# Patient Record
Sex: FEMALE | Race: BLACK OR AFRICAN AMERICAN | NOT HISPANIC OR LATINO | Employment: FULL TIME | URBAN - METROPOLITAN AREA
[De-identification: names, ages, dates, MRNs, and addresses within clinical notes are randomized per-mention and may not be internally consistent; named-entity substitution may affect disease eponyms.]

---

## 2023-04-12 ENCOUNTER — OFFICE VISIT (OUTPATIENT)
Dept: OBSTETRICS AND GYNECOLOGY | Age: 33
End: 2023-04-12
Payer: COMMERCIAL

## 2023-04-12 VITALS
DIASTOLIC BLOOD PRESSURE: 80 MMHG | SYSTOLIC BLOOD PRESSURE: 130 MMHG | HEIGHT: 62 IN | BODY MASS INDEX: 25.8 KG/M2 | WEIGHT: 140.2 LBS

## 2023-04-12 DIAGNOSIS — R10.2 PELVIC PAIN: ICD-10-CM

## 2023-04-12 DIAGNOSIS — Z12.4 SCREENING FOR CERVICAL CANCER: ICD-10-CM

## 2023-04-12 DIAGNOSIS — Z01.419 WELL WOMAN EXAM WITH ROUTINE GYNECOLOGICAL EXAM: Primary | ICD-10-CM

## 2023-04-12 DIAGNOSIS — Z76.89 ENCOUNTER TO ESTABLISH CARE: ICD-10-CM

## 2023-04-12 RX ORDER — SODIUM CHLORIDE 9 MG/ML
40 INJECTION, SOLUTION INTRAVENOUS AS NEEDED
OUTPATIENT
Start: 2023-04-12

## 2023-04-12 RX ORDER — DULOXETIN HYDROCHLORIDE 60 MG/1
1 CAPSULE, DELAYED RELEASE ORAL DAILY
COMMUNITY
Start: 2023-02-21

## 2023-04-12 RX ORDER — SODIUM CHLORIDE 0.9 % (FLUSH) 0.9 %
3-10 SYRINGE (ML) INJECTION AS NEEDED
OUTPATIENT
Start: 2023-04-12

## 2023-04-12 RX ORDER — SODIUM CHLORIDE 0.9 % (FLUSH) 0.9 %
3 SYRINGE (ML) INJECTION EVERY 12 HOURS SCHEDULED
OUTPATIENT
Start: 2023-04-12

## 2023-04-12 RX ORDER — DEXTROAMPHETAMINE SACCHARATE, AMPHETAMINE ASPARTATE MONOHYDRATE, DEXTROAMPHETAMINE SULFATE AND AMPHETAMINE SULFATE 5; 5; 5; 5 MG/1; MG/1; MG/1; MG/1
1 CAPSULE, EXTENDED RELEASE ORAL DAILY
COMMUNITY
Start: 2023-02-28

## 2023-04-12 RX ORDER — VITAMIN A ACETATE, BETA CAROTENE, ASCORBIC ACID, CHOLECALCIFEROL, .ALPHA.-TOCOPHEROL ACETATE, DL-, THIAMINE MONONITRATE, RIBOFLAVIN, NIACINAMIDE, PYRIDOXINE HYDROCHLORIDE, FOLIC ACID, CYANOCOBALAMIN, CALCIUM CARBONATE, FERROUS FUMARATE, ZINC OXIDE, CUPRIC OXIDE 3080; 12; 120; 400; 1; 1.84; 3; 20; 22; 920; 25; 200; 27; 10; 2 [IU]/1; UG/1; MG/1; [IU]/1; MG/1; MG/1; MG/1; MG/1; MG/1; [IU]/1; MG/1; MG/1; MG/1; MG/1; MG/1
1 TABLET, FILM COATED ORAL
COMMUNITY

## 2023-04-12 RX ORDER — LAMOTRIGINE 25 MG/1
2 TABLET ORAL DAILY
COMMUNITY
Start: 2023-01-18

## 2023-04-12 RX ORDER — CYCLOBENZAPRINE HCL 5 MG
TABLET ORAL
COMMUNITY
Start: 2023-02-01

## 2023-04-12 RX ORDER — ACETAMINOPHEN 160 MG
TABLET,DISINTEGRATING ORAL
COMMUNITY

## 2023-04-12 RX ORDER — FOLIC ACID 0.8 MG
TABLET ORAL
COMMUNITY

## 2023-04-12 NOTE — PROGRESS NOTES
"[facility]   Obstetrics and Gynecology   Routine Annual Visit    2023    Patient: Roberta Antunez          MR#:[id number]    History of Present Illness    Chief Complaint   Patient presents with   • Annual Exam   • Establish Care     NGYN - AE today, Last pap \"2 yrs ago\" normal, No problems today       32 y.o. female  who presents for annual exam.    -Reports severe dysmenorrhea since menarche.  Describes severe cramps that radiates to hips, legs, and rectum.  Also gets constipation before menses then diarrhea during menses.  -Reports fullness in right lower quadrant that is intermittently, severely painful, triggered by deep palpation and transvag ultrasound probe  -Has been to ED multiple times over last few yrs for these issues with no clear etiology identified  -minimal pain with intercourse  -Has tried multiple types of NSAIDs and OCPs with minimal or temporary improvement in pain.  She feels frustrated by this.  Some OCPs also worsened migraines.  -Reports depression that fluctuates with cycle and mood swings  -Reports facial hair, recently prescribed spironolactone by PCP but has not yet started      US pelvis transvaginal (2023 15:00)  CT Abdomen & Pelvis W IV Contrast Without Oral Contrast (2022 04:20)      Obstetric History:  OB History        0    Para   0    Term   0       0    AB   0    Living   0       SAB   0    IAB   0    Ectopic   0    Molar   0    Multiple   0    Live Births   0               Menstrual History:     Patient's last menstrual period was 2023 (approximate).       Sexual History:   Sexual active with transmale partner, declines STD screen and contraception    ________________________________________  Patient Active Problem List   Diagnosis   • Pelvic pain     Past Medical History:   Diagnosis Date   • ADHD    • Chronic post-traumatic stress disorder (PTSD)    • Depression    • Migraine      Past Surgical History:   Procedure " Laterality Date   • CHOLECYSTECTOMY  01/2022    laparoscopically   • WISDOM TOOTH EXTRACTION       Social History     Tobacco Use   Smoking Status Never   • Passive exposure: Never   Smokeless Tobacco Never     Family History   Problem Relation Age of Onset   • Hyperlipidemia Father    • Anxiety disorder Father    • Heart attack Father    • Rheum arthritis Mother    • Migraines Mother    • Bipolar disorder Mother    • Breast cancer Neg Hx    • Ovarian cancer Neg Hx    • Uterine cancer Neg Hx    • Colon cancer Neg Hx      Prior to Admission medications    Medication Sig Start Date End Date Taking? Authorizing Provider   amphetamine-dextroamphetamine XR (ADDERALL XR) 20 MG 24 hr capsule Take 1 capsule by mouth Daily 2/28/23  Yes Mike Conley MD   Calcium Polycarbophil (FIBER-CAPS PO)    Yes Mike Conley MD   Cholecalciferol (Vitamin D3) 50 MCG (2000 UT) capsule    Yes Mike Conley MD   cyclobenzaprine (FLEXERIL) 5 MG tablet  2/1/23  Yes Mike Conley MD   DULoxetine (CYMBALTA) 60 MG capsule Take 1 capsule by mouth Daily. 2/21/23  Yes Mike Conley MD   lamoTRIgine (LaMICtal) 25 MG tablet Take 2 tablets by mouth Daily. 1/18/23  Yes Mike Conley MD   Magnesium 500 MG capsule    Yes Mike Conley MD   prenatal vitamins (PRENATAL 27-1) 27-1 MG tablet tablet Take 1 tablet by mouth.   Yes Mike Conley MD     ________________________________________    Current contraception: none  History of abnormal Pap smear: no  Family history of uterine or ovarian cancer: no  Family History of colon cancer/colon polyps: no  History of abnormal mammogram: no    The following portions of the patient's history were reviewed and updated as appropriate: allergies, current medications, past family history, past medical history, past social history, past surgical history and problem list.    Review of Systems   All other systems reviewed and are negative.           Objective  "    /80   Ht 157.5 cm (62\")   Wt 63.6 kg (140 lb 3.2 oz)   LMP 03/06/2023 (Approximate)   Breastfeeding No   BMI 25.64 kg/m²    BP Readings from Last 3 Encounters:   04/12/23 130/80      Wt Readings from Last 3 Encounters:   04/12/23 63.6 kg (140 lb 3.2 oz)        BMI: Estimated body mass index is 25.64 kg/m² as calculated from the following:    Height as of this encounter: 157.5 cm (62\").    Weight as of this encounter: 63.6 kg (140 lb 3.2 oz).    Physical Exam  Vitals and nursing note reviewed.   Constitutional:       General: She is not in acute distress.     Appearance: Normal appearance.   HENT:      Head: Normocephalic and atraumatic.   Eyes:      Extraocular Movements: Extraocular movements intact.   Cardiovascular:      Rate and Rhythm: Normal rate.   Pulmonary:      Effort: Pulmonary effort is normal. No respiratory distress.   Abdominal:      General: There is no distension.      Palpations: Abdomen is soft. There is no mass.      Tenderness: There is no abdominal tenderness.   Genitourinary:     Vagina: Normal. No tenderness.      Cervix: No cervical motion tenderness or friability.      Uterus: Normal. Tender.       Adnexa: Left adnexa normal.        Right: Tenderness and fullness present.         Left: No mass, tenderness or fullness.        Rectum: Normal. No tenderness.      Comments: Tenderness most apparent in RLQ on bimanual exam, no tenderness in vaginal muscles or rectovaginal septum, pelvic organs freely mobile  Musculoskeletal:         General: Normal range of motion.      Cervical back: Normal range of motion.   Skin:     General: Skin is warm and dry.   Neurological:      General: No focal deficit present.      Mental Status: She is alert and oriented to person, place, and time.   Psychiatric:         Mood and Affect: Mood normal.         Behavior: Behavior normal.         As part of wellness and prevention, the following topics were discussed with the patient:  Encouraged self " breast exam  Physical activity and regular exercised encouraged.   Injury prevention discussed.  Healthy weight discussed.  Nutrition discussed.  Substance abuse/misuse discussed.  Sexual behavior/safe practices discussed.   Sexual transmitted disease prevention   Contraception discussed.   Mental health discussed.   Vaccinations/immunizations addressed.             Assessment:  Diagnoses and all orders for this visit:    1. Well woman exam with routine gynecological exam (Primary)  -     IGP, Apt HPV,rfx 16 / 18,45    2. Encounter to establish care    3. Screening for cervical cancer  -     IGP, Apt HPV,rfx 16 / 18,45    4. Pelvic pain  -     Case Request; Standing  -     sodium chloride 0.9 % flush 3 mL  -     sodium chloride 0.9 % flush 3-10 mL  -     sodium chloride 0.9 % infusion 40 mL  -     Case Request    Other orders  -     Outpatient In A Bed; Standing  -     Follow Anesthesia Guidelines / Protocol; Future  -     Obtain Informed Consent; Future  -     Provide NPO Instructions to Patient; Future  -     Chlorhexidine Skin Prep; Future  -     Follow Anesthesia Guidelines / Protocol; Standing  -     Verify / Perform Chlorhexidine Skin Prep; Standing  -     Verify / Perform Chlorhexidine Skin Prep if Indicated (If Not Already Completed); Standing  -     Insert Peripheral IV; Standing  -     Saline Lock & Maintain IV Access; Standing      -Breast exam normal  - Pap today  - Declined STD screening  - Declines contraception    - Discussed longstanding history of pelvic pain in detail.  Discussed that her history is suggestive of endometriosis but the gold standard of diagnosis is laparoscopy with confirmatory pathology.  Reviewed the pathophysiology of the disease process and various management options ranging from medical suppression to surgical resection +/- hysterectomy.  Patient has failed multiple forms of medical management and is interested in diagnostic laparoscopy for evaluation of pelvic pain.  She is  certain she does not desire any future childbearing.  -Discussed possible insertion of IUD during procedure but patient declined.  She would just like to achieve diagnosis at this point.  We can then discuss management options.        Trina Thomas MD  4/12/2023 15:24 EDT

## 2023-04-18 ENCOUNTER — PATIENT MESSAGE (OUTPATIENT)
Dept: OBSTETRICS AND GYNECOLOGY | Age: 33
End: 2023-04-18
Payer: COMMERCIAL

## 2023-04-18 LAB
CYTOLOGIST CVX/VAG CYTO: NORMAL
CYTOLOGY CVX/VAG DOC CYTO: NORMAL
CYTOLOGY CVX/VAG DOC THIN PREP: NORMAL
DX ICD CODE: NORMAL
HIV 1 & 2 AB SER-IMP: NORMAL
HPV I/H RISK 4 DNA CVX QL PROBE+SIG AMP: NEGATIVE
OTHER STN SPEC: NORMAL
STAT OF ADQ CVX/VAG CYTO-IMP: NORMAL

## 2023-04-19 NOTE — TELEPHONE ENCOUNTER
From: Roberta Antunez  To: Trina Thomas  Sent: 4/18/2023 4:40 PM EDT  Subject: LISA Antunez - Follow-Up Questions/Notes    Good evening, Dr. Thomas:    Thank you so much for taking the time to sit down with me at my appointment and address my concerns. I greatly appreciate it.    I have a few follow-up questions/notes:  1. I noticed during sexual intercourse with Imer that I experience burning at my vaginal entrance at the bottom after any type of penetration (fingers, toys, etc). This happens 65% - 70% of the time and has occurred with all past partners.    2. What is the recovery period for the exploratory laparoscopy?    3. Are you open to completing FMLA paperwork for my primary workplace for leave for my laparoscopy?    4. I may start my period within the next two weeks. Does this mean we need to delay the exploratory laparoscopy?    Thank you so much!

## 2023-04-19 NOTE — PROGRESS NOTES
Please call patient: Pap smear is normal and HPV negative. I recommend repeating in 5 years.  Thank you!    Trina Thomas MD  9/29/2021  10:14 EDT